# Patient Record
(demographics unavailable — no encounter records)

---

## 2017-06-21 NOTE — REP
WHOLE BODY BONE SCAN:

 

Following the intravenous administration of 22 mCi of technetium-99m MDP,

patient's whole body is imaged in the anterior and posterior projections, with

additional oblique images of the thoracic and pelvic regions performed as well as

lateral views of the calvarium, knees, and feet.

 

There is no abnormal uptake in the left proximal femur or right sacrum at the

site of bone lesions seen on the recent MRI of the right hip dated 05/24/2017.

There is mild diffuse arthritic uptake in the thoracic and cervical spine region.

Increased uptake in the posterior calvarium in the midline is likely due to

hyperostosis.  Bilateral arthritic uptake is seen in the knees and feet.  There

also appears to be hyperostosis in the frontal bone region of the calvarium.

 

There is no compelling scintigraphic evidence of osseous metastases.  Renal and

bladder activity are seen.

 

IMPRESSION:

 

Scattered arthritic uptake as above.  No compelling scintigraphic evidence of

osseous metastases.  No abnormal uptake in the right sacrum or proximal left

femur at the site of bone lesions seen on the recent MRI.

 

 

Signed by

Juan Daniel Cordero MD 06/22/2017 05:10 P

## 2017-07-09 NOTE — ECGEPIP
Stationary ECG Study

                           [facility] - ED

                                       

                                       Test Date:    2017

Pat Name:     ALIDA SEGOVIA            Department:   

Patient ID:   [id number]                 Room:         -

Gender:       F                        Technician:   sb

:          1948               Requested By: JOSE ALFREDO HILTON

Order Number: [id number]     Reading MD:   John Garcia

                                 Measurements

Intervals                              Axis          

Rate:         61                       P:            70

NY:           165                      QRS:          25

QRSD:         97                       T:            12

QT:           426                                    

QTc:          432                                    

                           Interpretive Statements

SINUS RHYTHM

POSSIBLE LAE

INC. RBBB

NONSPECIFIC ST & T-WAVE ABNORMALITY

SIMILAR TO 8/22/15

Electronically Signed On 2017 18:02:17 EDT by John Garcia

## 2017-07-21 NOTE — REPMRS
Patient History

The patient states she has not had a clinical breast exam in over

a year.

Patient is postmenopausal.

Family history of prostate cancer in brother at age 50 or over 

and breast cancer in niece under age 50.

 

Digital Woman Screen Mammo: July 21, 2017 - Exam #: 

[id number]

Bilateral CC and MLO view(s) were taken.

 

Technologist: Meghna Swanson Technologist

Prior study comparison: October 5, 2015, digital bilateral 

screening mammo, performed at [facility].  November 4, 2013, digital woman screen mammo performed at [facility].  July 29, 2011, bilateral bilat screen digital mammo 

performed at [facility].

 

FINDINGS: There are scattered fibroglandular densities.  There 

has been no change in the appearance of the mammogram from the 

prior studies.  There is a mild amount of scattered 

fibroglandular density which is fairly symmetric. There is no 

interval development of dominant mass, architectural distortion, 

or clustered microcalcification suggestive of malignancy.

 

ASSESSMENT: BI-RADS/ACR category 1 mammogram. Negative.

 

Recommendation

Routine screening mammogram in 1 year (for women over age 40).

This mammogram was interpreted with the aid of an FDA-approved 

computer-aided dectection system.

 

Electronically Signed By: Joselito Fu MD 07/21/17 [id number]

## 2017-07-24 NOTE — DEXA
AP SPINE   L1 - L4   1.067   -1.0      -0.1

LT FEMUR   TOTAL   1.036    0.2       0.7

RT FEMUR   TOTAL   0.981   -0.2       0.2

TOTAL BODY   TOTAL

OTHER



DUAL FEMUR FRAX* ASSESSMENT

Risk factors:               History of adult fracture.

10 year probability of fracture

Major osteoporotic fracture            5.2 %

Hip fracture               0.3 %



COMMENTS:

Normal bone densitometry of the spine and hips.

The density of the spine has increased 1.2% since 11/04/2013.

The density of the left hip has decreased 0.7% since 11/04/2013.

The density of the right hip has decreased 4.5% since 11/04/2013.



FOLLOW-UP:

Recommendation for the next bone density exam: 5 years.
KATIE

## 2019-06-26 NOTE — ECHO
DATE OF PROCEDURE:  06/26/2019

 

 

AGE:  71

GENDER:  Female.

HEIGHT:   67 inches.

WEIGHT: 230 pounds.

BODY SURFACE AREA:  2.15 meters squared.

LOCATION:  Outpatient

 

REFERRING PHYSICIAN:  Lu Leyva MD

 

INDICATION:  Murmur.

 

MEASUREMENTS

2-D measurements:

RV - 3.4 cm

LV - 4.7 cm

Septum 1.2 cm

Posterior wall 1.2 cm

Aortic root 3.2 cm

LA - 4.0 cm

LVEF 75%

 

DOPPLER MEASUREMENTS:

AV - 1.52 meters per second

LVOT 1.28 meters per second

LVOT diameter 2.2 cm

MV - E 90, A 102, E/E ratio 0.9

Early mitral deceleration time 261 milliseconds

E prime 6.2, A prime 9.2, E/E prime ratio 14.5

PCWP 16.2 mmHg

PV - 0.9 meters per second

Pulmonary artery acceleration time 158 milliseconds

PASP - 13 mmHg

IVC - 1.7 cm

 

COMMENTS:  Normal sinus rhythm with occasional PACs.

 

Technically challenging study in light of the patient's body habitus but

diagnostically useful information was still obtained.

 

M-mode and two-dimensional echocardiography was performed with pulsed, continuous

wave, color flow and tissue Doppler studies.

 

Borderline concentric left ventricular hypertrophy with hyperkinetic wall

motion.

Borderline left atrial enlargement with impairment of LV diastolic function and

current estimated mean left atrial pressure that was slightly elevated.

 

Normal right heart chamber sizes, wall motion and estimated pulmonary arterial

pressure.

 

Normal IVC size and collapse against an elevated central venous pressure.

 

Normal-appearing aortic valve and valve function.

 

Normal aortic root size.

 

Mild mitral annular calcification without functional valvular abnormality.

 

Normal appearing tricuspid valve with very mild insufficiency.

 

No apparent intracardiac mass or pericardial effusion.

 

Unable to detect a functional or structural abnormality to account for the

patient's heart murmur (likely physiological / flow related).

 

Thank you,

 

 

David Ryan MD [facility]

## 2021-04-22 NOTE — REPMRS
Patient History

The patient states she had a clinical breast exam in April 2021.

Patient is postmenopausal.

Family history of breast cancer under age 50 in niece, prostate 

cancer at age 50 or over in brother.

Took hormonal contraceptives for 25 years.  Took estrogen for 10 

years.

Patient states no breast complaints today. Patient has signed MRS

History Sheet.

 

Digital Woman Screen Mammo: April 22, 2021 - Exam #: 

[id number]

Bilateral CC and MLO view(s) were taken.

 

Technologist: RT Radha

Prior study comparison: July 21, 2017, digital woman screen mammo

performed at [facility]. 

October 5, 2015, digital bilateral screening mammo, performed at

[facility].

 

FINDINGS: There are scattered fibroglandular densities.  

Screening.

 

Digital screening (2D) mammography was performed bilaterally in 

the CC and MLO projections. Additionally, breast tomosynthesis 

(3D mammography) was performed bilaterally in the CC and MLO 

projections. Todays exam was compared to the prior exams(s).

 

By history, the patient has no complaints of a palpable breast 

abnormality or other significant breast complaints.

 

The breasts are unchanged in size and shape. There are no 

mauro-soft tissue densities or spiculated masses. There is no 

internal architectural distortion. Once again, stable benign 

appearing calcifications are seen.There are no suspicious 

mauro-calcific clusters. Skin thickening or nipple retraction is 

not present.

 

IMPRESSION:

 

BI-RADS Category 2- Benign Findings(s). There is no evidence of 

malignant alteration of the breasts. Followup examination 

recommended in one year.

The Volpara volumetric breast density category is B, there are 

scattered areas of fibroglandular density.

This mammogram was read with the assistance of [facility] 

AMP,an FDA approved computer aided detection system for 

mammography.

The lifetime Tyrer-Cuzick score is     3.9 %

Negative x-ray reports should not delay surgical consultation if 

a dominant or clinically suspicious mass is present.

 

Not all breast cancers can be identified by mammography. 

Therefore, we recommend that you continue to perform regular 

breast self-examination and physical examination and then 

promptly contact your physician of any concerns or changes.

 

Adenosis and dense breasts may obscure an underlying neoplasm.

 

Assessment: BI-RADS/ACR category 2 mammogram. Benign Findings.

 

Recommendation

Routine screening mammogram of both breasts in 1 year.

 

Electronically Signed By: Dimas Bermeo DO 04/22/21 [id number]